# Patient Record
(demographics unavailable — no encounter records)

---

## 2025-04-21 NOTE — SOCIAL HISTORY
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 Eventually will work from home 1 day alternating with 2 days. Right now is doing half days. Got sick 2 weeks after getting this new job.  Sleep is not great. Uses THC to avoid nightmares. Doesn't usually sleep through without it.

## 2025-04-21 NOTE — ASSESSMENT
[FreeTextEntry1] : Morbid obesity with BMI>50:  I agree that after all that she has been through, I would not take the risk of pancreatitis on at this time. I explained that within a few years we will have more data on the subject and there will be oral agents available that might be preferable as the drug will not be in her system for a week or longer if she doesn't tolerate it. At this point, I believe she needs to give herself some more jannette. She has genetics against her, trauma from her childhood, low metabolic rate likely related to yo yoing weights over the years, the ramifications of the pancreatitis, the issues related to her recent odyssey with the plantar fascia rupture and osteomyelitis. It is much harder for her to lose weight than for the majority of the population. At this point, focusing on getting her on a healthy dietary pattern, getting her moving again, improving her body composition, eventually working on her sleep and her mental health are the priority rather than a specific number on the scale. She is going to make slow, but steady progress with support along the way.  Reviewed healthy dietary patterns. Discussed the mediterranean diet in particular. She was given both the nutrition basics and mediterranean diet handouts. We also reviewed her current physical capabilities and set goals to move consistently, but hopefully not to the point that she will be so adversely affected the following day that she won't be able to move.  She agreed to the following action plan: Obtain list of covered psychological providers - you can send it through the patient portal and I will try to research someone with expertise in disordered eating.      Physical Activity   15 minute walk without stopping 5 days per week   Either the bands or the 3 pound weights - two days a week   Wednesdays and Fridays evenings   Youtube "5 minutes upper body light weights or bands" "5 minutes core" "5 minutes lower body light weights or bands"   Nutrition   3 servings of beans/legumes per week   See recipes   Follow up 1 month.  Total time spent reviewing records, seeing patient, generating action plan, documenting visit 80 minutes.

## 2025-04-21 NOTE — HISTORY OF PRESENT ILLNESS
[FreeTextEntry1] : 42 year old female presents to [facility] for weight management.  Cindy has had a longstanding issue with her weight. She was an athlete in high school, remembers being around a size 8 which was appropriate, yet at the age of 14 her parents and pediatrician had her go on Weight Watchers. This set her off on a path of distorted body image and disordered eating. She has issues with binging, emotional eating, etc. She does see a therapist, but that provider does not have disordered eating expertise. 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 Her father passed away, which was very difficult. She had pancreatitis in 2017 (there was some documentation of chronic pancreatitis and possible insufficiency, but this was ruled out at [facility]). She had put on a lot of weight despite WW 2 more times and also trying to be a pescatarian. She and her PCP had discussed starting Wegovy last year despite the history of pancreatitis. Because more recent evidence suggests the GLP drugs do not increase the risk of pancreatitis and because her weight carried a significant amount of risk, she was willing to give it a chance. However, her job changed and the drug is no longer covered. In addition, she suffered a severe medical setback subsequent to that. She developed plantar fasciitis and had a cortisone shot that caused a complete rupture which led to a staph infection and subsequent osteomyelitis. She spent two full weeks in the hospital for surgeries. She had a picc line in for 12 weeks of daptomycin and now has lymphedema in that arm. She basically did not walk for several months and only just recently started walking and working with a physical therapist. At this point she wouldn't even want to consider the GLP drugs because if there is even a small chance of pancreatitis and hospitalization, she just can't risk it at this time.   Patient currently is working with [employer] meals. She lives alone and doesn't love to cook. 24 hour recall: BF: 2 eggs and toast Lunch: Factor meal If not that, then turkey and swiss sandwich Dinner: Factor meal She has 8 Factor meals per week. Snacks: GS samoa cookies,  Also uses Factor chocolate coconut bars, pretzels, carrots and hummus  At this point she can walk 3/4 of a mile in 20-25 minutes with the dog if she stretches first and takes advil. When she goes into the city for work she drives, but she has to walk on the campus and she is usually pretty exhausted after those days. She owns 3 pound weights and resistance bands.  Fruit: Likes berries and apples, cherries, palmellos -- tries to eat in season. In season she may have several servings of fruit in a week. Vegetables: Loves them except for sweet potatoes, pumpkin, butternut squash and fennel. Will eat delicata squash and acorn squash. Beans: Loves all kinds Eats quinoa Likes fish -- shellfish, salmon, tuna Eats poultry now -- was eating beef even though she doesn't like it because when she was ill the doctors were pressuring her to eat more protein and recommending she use the beef to get it.

## 2025-05-23 NOTE — HISTORY OF PRESENT ILLNESS
[FreeTextEntry1] : Patient presents/consents for telehealth with video and audio follow-up.  She is in her home in [place], [place].  Primary office in [place], [place].  Patient was actually in the office this morning for her vital signs.  She thought her appointment was at 930, but it was at 9:00.  Because I was booked back-to-back until lunchtime she agreed to go home and see me in telehealth during the first part of my lunch break.  Patient has been more mobile than when I last saw her.  She can definitely get around more on her foot and have the osteomyelitis, but the pain is worse.  She had to go into the office 3 days this week and she was just exhausted.  The physical therapist is helping pinpoint what the problem is.  It appears to be a gait issue.  Patient went to her primary to get an official diagnosis of lymphedema in her arm from when that PICC line was in.  She has seen the lymphedema physical therapist and will be working with her.  Patient has an Apple Watch now and she sees that she is walking 3+ miles a day, which is a big improvement.  She is doing a 15-minute walk with the dog about 5 days a week.  Patient has not been as successful with the weights and bands, but she understands the importance and has set the intention to try to incorporate them.  She has been doing a lot of physical therapy instead.  24-hour recall: Breakfast: Hamburger bun, 2 eggs and chatter Nogal Lunch: Factor meal of chicken, vegetables including cauliflower Dinner: Ground turkey tacos Snack: Ice pop  Patient would like some help with evening snacks.

## 2025-05-23 NOTE — ASSESSMENT
[FreeTextEntry1] : Chronic morbid obesity: I am glad to hear patient is working hard on getting more active.  The 15-minute walk 5 times a week is a huge improvement.  She will continue to work with her physical therapist on her gait becoming more mobile.  She will try to do some upper body weights at minimum this month.  Patient was very successful at getting 3 servings of beans and per week.  That was very easy for her.  She will continue with that practice and she is going to try to make her grain products whole grains including things like the bread/hamburger bun.  She asked for specific assistance with evening snacks.  We talked about popcorn with healthy toppings, though right now she has some dental issues so she has to hold off on that.  We also talked about a combination of a half a cup of berries, 10 nuts, Ghiradelli square of chocolate.  We also talked about the importance of eating the snacks mindfully and deciding that the kitchen is closed after that meal.  This month patient will work on getting some upper body weight and then continuing with the walking as well as making her grains fall, continuing with beans 2-3 times per week, and eating evening snacks mindfully and keeping quality high.  Follow-up 1 to 2 months  Total time spent seeing patient, documenting visit 33 minutes.

## 2025-06-27 NOTE — HISTORY OF PRESENT ILLNESS
[FreeTextEntry1] : Patient presents today for weight management follow-up.  She is down another 5 pounds.  Patient said that her lifestyle efforts have been going well.  Having the nuts with the berries and the small amount of dark chocolate has actually controlled her binge eating in the evening about 75% of the time.  Sometimes she is still having some binging episodes.  If she buys fancy cheese and crackers for company, she might eat all of the leftovers.  If she buys fantasy cakes, she might binge on the leftovers after that.  Sometimes she orders food in and intends to eat half a portion and then will eat the entire portion.  She cannot really tell when the binge is about to happen.  It is kind of a slow gradual process.  She has not been eating her food more mindfully, but she has been more aware of when she is not eating food mindfully, which is progress.  Patient's lymphedema is still an issue and she has to get back on top of coverage for treatment.  Patient has been doing a lot more strength with physical therapy.  Her foot is doing well.  She went to \[facility]\"" and was able to do a bike ride and a lot more walking than usual.  Patient has an Apple Watch and it definitely makes her more cognizant of not sitting as long as she used to.  Patient continues to walk the dog for 15 minutes 5 times a week, probably more sometimes.  Patient went to [facility] with a friend and was able to make her portions smaller.  She had half of sandwich, bought a rainbow cookie and a few pignoli cookies to bring home. She was happy with her ability to manage food and portions while out in a tempting environment.  Patient is still commuting 3 days a week and is very wiped out.  For the time being she has 3-day weekends, which helps because she gets to do chores 1 day, the social the next day, and then take a day for rest.  1 thing patient has not been as great about his doing weights/resistance bands for upper body.  Patient has been very successful making her grains whole.  24-hour recall Breakfast: Whole-wheat toast, 2 eggs and everything but the bagel seasoning Lunch: 2 caffeinated seltzers, Factor meal Dinner: Poke bowl with tuna, salmon, little bit of spicy meal, reddish, rice, edamame

## 2025-06-27 NOTE — ASSESSMENT
[FreeTextEntry1] : Patient with chronic morbid obesity: Patient lost almost 5 pounds this month.  I commended her on her efforts.  She is really doing some excellent work and is being kind to herself and patient.  1 major kalina is her binging.  She has gotten it down to 75%, which is excellent.  We talked about trying to remove some of the fibers binging items from the house.  One example is to send the leftovers home with the people.  Visit.  We also talked about trying to do a mindful eating practice when she is eating after dinner because the mindfulness may keep her from mindlessly binging.  We also talked about using some music therapy.  She finds that when she is most depressed, the binging is more likely to occur, so she puts on a play list that has songs that normally make her feel good, it might short-circuit the process.  Patient's strength goal this month will be to more regularly incorporate the dumbbells or resistance bands with upper body exercises a minimum of twice a week.   Total time spent counseling patient and documenting visit 25 minutes.